# Patient Record
Sex: MALE | Race: WHITE | NOT HISPANIC OR LATINO | URBAN - METROPOLITAN AREA
[De-identification: names, ages, dates, MRNs, and addresses within clinical notes are randomized per-mention and may not be internally consistent; named-entity substitution may affect disease eponyms.]

---

## 2019-12-23 ENCOUNTER — EMERGENCY (EMERGENCY)
Facility: HOSPITAL | Age: 24
LOS: 0 days | Discharge: HOME | End: 2019-12-23
Attending: EMERGENCY MEDICINE
Payer: COMMERCIAL

## 2019-12-23 VITALS — WEIGHT: 179.9 LBS | HEIGHT: 70 IN

## 2019-12-23 VITALS
HEART RATE: 69 BPM | DIASTOLIC BLOOD PRESSURE: 76 MMHG | SYSTOLIC BLOOD PRESSURE: 145 MMHG | OXYGEN SATURATION: 98 % | TEMPERATURE: 98 F | RESPIRATION RATE: 18 BRPM
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
## 2019-12-23 DIAGNOSIS — M25.512 PAIN IN LEFT SHOULDER: ICD-10-CM
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
## 2019-12-23 PROCEDURE — 99285 EMERGENCY DEPT VISIT HI MDM: CPT | Mod: 25

## 2019-12-23 PROCEDURE — 71045 X-RAY EXAM CHEST 1 VIEW: CPT | Mod: 26

## 2019-12-23 PROCEDURE — 71250 CT THORAX DX C-: CPT | Mod: 26

## 2019-12-23 PROCEDURE — 71045 X-RAY EXAM CHEST 1 VIEW: CPT

## 2019-12-23 PROCEDURE — 71250 CT THORAX DX C-: CPT

## 2019-12-23 PROCEDURE — 99284 EMERGENCY DEPT VISIT MOD MDM: CPT

## 2019-12-23 PROCEDURE — 73000 X-RAY EXAM OF COLLAR BONE: CPT | Mod: 26,LT

## 2019-12-23 PROCEDURE — 73000 X-RAY EXAM OF COLLAR BONE: CPT | Mod: LT

## 2019-12-23 RX ORDER — ACETAMINOPHEN 500 MG
975 TABLET ORAL ONCE
Refills: 0 | Status: COMPLETED | OUTPATIENT
Start: 2019-12-23 | End: 2019-12-23

## 2019-12-23 RX ADMIN — Medication 975 MILLIGRAM(S): at 19:50

## 2019-12-23 NOTE — ED STATDOCS - NS_ ATTENDINGSCRIBEDETAILS _ED_A_ED_FT
I, Chandu Burt MD,  performed the initial face to face bedside interview with this patient regarding history of present illness, review of symptoms and relevant past medical, social and family history.  I completed an independent physical examination.  I was the initial provider who evaluated this patient.  The history, relevant review of systems, past medical and surgical history, medical decision making, and physical examination was documented by the scribe in my presence and I attest to the accuracy of the documentation.

## 2019-12-23 NOTE — ED STATDOCS - CLINICAL SUMMARY MEDICAL DECISION MAKING FREE TEXT BOX
25 y/o male presents for left clavicle pain s/p fall while snow boarding. Exam with deformity and TTP to left clavicle, no other signs of traumatic injury. Will obtain XR, pain control, and reassess.

## 2019-12-23 NOTE — ED STATDOCS - PHYSICAL EXAMINATION
Constitutional: NAD AAOx3  Eyes: PERRLA EOMI  Head: Normocephalic atraumatic  Mouth: MMM  Cardiac: regular rate   Resp: Lungs CTAB  GI: Abd s/nt/nd  Neuro: CN2-12 intact  Skin: No rashes  MSK: No midline spinal tenderness, +deformity left clavicle, no deformity to shoulder, no elbow TTP normal peripheral pulses Constitutional: NAD AAOx3  Eyes: PERRLA EOMI  Head: Normocephalic atraumatic  ENT: No romero sign, no raccoon eyes, no hemotympanum, no csf rhinorrhea, no nasal septal hematoma  Mouth: MMM  Cardiac: regular rate   Resp: Lungs CTAB  GI: Abd s/nt/nd  Neuro: CN2-12 intact  Skin: No rashes, no bruising to chest, back, abdomen or extremities  Msk: No midline spinal ttp, full ROM of neck, c-collar cleared clinically and with provocative testing, no ttp of facial bones, no ttp to chest wall, pelvis stable, +deformity left clavicle, no deformity to shoulder, no elbow TTP normal peripheral pulse

## 2019-12-23 NOTE — ED STATDOCS - PATIENT PORTAL LINK FT
You can access the FollowMyHealth Patient Portal offered by [facility] by registering at the following website: [email or web address]. By joining [facility]’s FollowMyHealth portal, you will also be able to view your health information using other applications (apps) compatible with our system.

## 2019-12-23 NOTE — ED ADULT NURSE NOTE - OBJECTIVE STATEMENT
Patient presents to ED complaining of shoulder pain. Patient states he was snowboarding and fell on L side. Patient complaining of 2/10 L shoulder pain. Patient dneies hitting head, -LOC, was wearing helmet at time of injury. Patient denies HA, blurred. vision changes. Patient able to move extremity. A&0x3

## 2019-12-23 NOTE — ED STATDOCS - OBJECTIVE STATEMENT
25 y/o male with no PMHx presents to the ED c/o +left shoulder pain. Was snow boarding today and fell, causing the pain. Rates pain 2/10. Was wearing a helmet. Has no other complaints. No fever. Nonsmoker. Drinks socially.

## 2019-12-23 NOTE — ED STATDOCS - NS ED ROS FT
Constitutional: No fever or chills  Eyes: No visual changes  HEENT: No throat pain  CV: No chest pain  Resp: No SOB no cough  GI: No abd pain, nausea or vomiting  : No dysuria  MSK: +left shoulder pain  Skin: No rash  Neuro: No headache

## 2019-12-23 NOTE — ED STATDOCS - PROGRESS NOTE DETAILS
23 y/o male with no PMHx presents to the ED c/o +left shoulder pain. Was snow boarding today and fell, causing the pain. Rates pain 2/10. Was wearing a helmet. Has no other complaints  PE: +deformity to L clavicle, +peripheral pulses, lungs CTA b/l, heart RRR s1/s2  Plan: XRs  Fabi Reza PA-C XRs with questionable clavicle dislocation, will obtain CT chest   Fabi Reza PA-C CT chest with no dislocation, plan to d/c home with outpt f/u with ortho, pt agreeable to d/c and plan of care, all questions answered, return precautions given  Fabi Reza PA-C

## 2019-12-23 NOTE — ED ADULT TRIAGE NOTE - CHIEF COMPLAINT QUOTE
Fall snowboarding today around 11AM. Was wearing helmet, denies hitting head, -LOC. Complaining of left shoulder pain. 800mg Motrin taken around 530pm.